# Patient Record
Sex: MALE | Race: WHITE | NOT HISPANIC OR LATINO | Employment: STUDENT | URBAN - METROPOLITAN AREA
[De-identification: names, ages, dates, MRNs, and addresses within clinical notes are randomized per-mention and may not be internally consistent; named-entity substitution may affect disease eponyms.]

---

## 2019-06-01 ENCOUNTER — OFFICE VISIT (OUTPATIENT)
Dept: FAMILY MEDICINE | Facility: CLINIC | Age: 24
End: 2019-06-01
Payer: COMMERCIAL

## 2019-06-01 VITALS
SYSTOLIC BLOOD PRESSURE: 100 MMHG | WEIGHT: 163.38 LBS | DIASTOLIC BLOOD PRESSURE: 60 MMHG | TEMPERATURE: 99 F | OXYGEN SATURATION: 99 % | HEART RATE: 64 BPM | HEIGHT: 72 IN | BODY MASS INDEX: 22.13 KG/M2

## 2019-06-01 DIAGNOSIS — Z23 NEED FOR HPV VACCINATION: ICD-10-CM

## 2019-06-01 DIAGNOSIS — J02.9 SORE THROAT: Primary | ICD-10-CM

## 2019-06-01 PROCEDURE — 3008F BODY MASS INDEX DOCD: CPT | Mod: CPTII,S$GLB,, | Performed by: INTERNAL MEDICINE

## 2019-06-01 PROCEDURE — 90651 HPV VACCINE 9-VALENT 3 DOSE IM: ICD-10-PCS | Mod: S$GLB,,, | Performed by: INTERNAL MEDICINE

## 2019-06-01 PROCEDURE — 3008F PR BODY MASS INDEX (BMI) DOCUMENTED: ICD-10-PCS | Mod: CPTII,S$GLB,, | Performed by: INTERNAL MEDICINE

## 2019-06-01 PROCEDURE — 99203 PR OFFICE/OUTPT VISIT, NEW, LEVL III, 30-44 MIN: ICD-10-PCS | Mod: 25,S$GLB,, | Performed by: INTERNAL MEDICINE

## 2019-06-01 PROCEDURE — 99203 OFFICE O/P NEW LOW 30 MIN: CPT | Mod: 25,S$GLB,, | Performed by: INTERNAL MEDICINE

## 2019-06-01 PROCEDURE — 90651 9VHPV VACCINE 2/3 DOSE IM: CPT | Mod: S$GLB,,, | Performed by: INTERNAL MEDICINE

## 2019-06-01 PROCEDURE — 90471 IMMUNIZATION ADMIN: CPT | Mod: S$GLB,,, | Performed by: INTERNAL MEDICINE

## 2019-06-01 PROCEDURE — 90471 HPV VACCINE 9-VALENT 3 DOSE IM: ICD-10-PCS | Mod: S$GLB,,, | Performed by: INTERNAL MEDICINE

## 2019-06-01 NOTE — PROGRESS NOTES
"Subjective:       Patient ID: Mega Hastings is a 23 y.o. male.        Chief Complaint: Sore Throat  He presents with 2 days of sore throat and fatigue. No cold symptoms. No fevers or chills. No sinus congestion. No coughing or rashes.  His brother had mono 2 months ago and he is concerned.  He has been very tired lately.      Review of Systems   Constitutional: Positive for fatigue. Negative for activity change, appetite change, chills and fever.   HENT: Positive for sore throat. Negative for congestion, ear discharge, ear pain, mouth sores, postnasal drip, rhinorrhea and sinus pressure.    Eyes: Negative for pain, discharge and redness.   Respiratory: Negative for cough, chest tightness, shortness of breath and wheezing.    Gastrointestinal: Negative for abdominal pain, constipation, diarrhea, nausea and vomiting.   Genitourinary: Negative for dysuria.   Musculoskeletal: Negative for arthralgias and neck stiffness.   Skin: Negative for rash.   Neurological: Negative for headaches.   Hematological: Negative for adenopathy.       Objective:      Vitals:    06/01/19 0927   BP: 100/60   Pulse: 64   Temp: 98.5 °F (36.9 °C)   TempSrc: Oral   SpO2: 99%   Weight: 74.1 kg (163 lb 6.4 oz)   Height: 5' 11.5" (1.816 m)     Body mass index is 22.47 kg/m².  Physical Exam    General appearance: alert, no acute distress  Head: atraumatic  Eyes: PERRL, EMOI, normal conjunctiva, no drainage  Ears: tm normal with good visualization of landmarks, no erythema or pus, canals normal, external ear normal  Nose: normal mucosa, no polyps or sores, no rhinorrhea  Throat: mild erythema, no exudates, tonsils appear normal  Mouth: no sores or lesion, moist mucous membranes  Neck: supple, FROM, no masses, no tenderness  Lymph: no posterior or cervical adenopathy  Lungs: no distress, no retractions, clear to ascultation bilaterally, no wheezing, no rales, no rhonchi  Heart:: Regular rate and rhythm, no murmur  Abdomen: soft, non-tender, no " guarding, no rebound, no peritoneal signs, bowel sounds normal, no hepatosplenomegaly, no masses  Skin: no rashes or lesion  Perfusion: good capillary refill, normal pulses      Assessment:       1. Sore throat    2. Need for HPV vaccination        Plan:       Sore throat  Reassurance and supportive care. No need for abx at this time. Advised of symptoms of worsening to return to clinic.   -     POCT Infectious mononucleosis antibody---neg  -     Strep A culture, throat---neg    Need for HPV vaccination  -     HPV Vaccine (9-Valent) (3 Dose) (IM)    Follow up in about 3 months (around 9/1/2019) for nurse visit for HPV #2.

## 2019-08-23 ENCOUNTER — OCCUPATIONAL HEALTH (OUTPATIENT)
Dept: URGENT CARE | Facility: CLINIC | Age: 24
End: 2019-08-23

## 2019-08-23 DIAGNOSIS — Z02.1 PRE-EMPLOYMENT EXAMINATION: Primary | ICD-10-CM

## 2019-08-23 LAB
BILIRUB UR QL STRIP: NEGATIVE
GLUCOSE UR QL STRIP: NEGATIVE
KETONES UR QL STRIP: NEGATIVE
LEUKOCYTE ESTERASE UR QL STRIP: NEGATIVE
PH, POC UA: 6.5 (ref 5–8)
POC BLOOD, URINE: NEGATIVE
POC NITRATES, URINE: NEGATIVE
PROT UR QL STRIP: NEGATIVE
SP GR UR STRIP: 1.02 (ref 1–1.03)
UROBILINOGEN UR STRIP-ACNC: NORMAL (ref 0.3–2.2)

## 2019-08-23 PROCEDURE — 99499 PHYSICAL, BASIC COMPLEXITY: ICD-10-PCS | Mod: S$GLB,,, | Performed by: PHYSICIAN ASSISTANT

## 2019-08-23 PROCEDURE — 99499 UNLISTED E&M SERVICE: CPT | Mod: S$GLB,,, | Performed by: PHYSICIAN ASSISTANT

## 2019-08-23 PROCEDURE — 80305 OOH NON-DOT DRUG SCREEN: ICD-10-PCS | Mod: S$GLB,,, | Performed by: PHYSICIAN ASSISTANT

## 2019-08-23 PROCEDURE — 80305 DRUG TEST PRSMV DIR OPT OBS: CPT | Mod: S$GLB,,, | Performed by: PHYSICIAN ASSISTANT

## 2019-09-04 ENCOUNTER — TELEPHONE (OUTPATIENT)
Dept: FAMILY MEDICINE | Facility: CLINIC | Age: 24
End: 2019-09-04

## 2019-09-04 NOTE — TELEPHONE ENCOUNTER
Left message on recorder that Mr. Ayoub missed his nurse appt for 2 hep A.  Please call clinic to schedule.

## 2019-12-17 ENCOUNTER — TELEPHONE (OUTPATIENT)
Dept: PEDIATRICS | Facility: CLINIC | Age: 24
End: 2019-12-17

## 2019-12-17 NOTE — TELEPHONE ENCOUNTER
----- Message from Lori Franks sent at 12/17/2019 11:36 AM CST -----  Contact: mom  Type: Needs Medical Advice    Who Called:  mom  Best Call Back Number: [phone]  Additional Information: mom would like to know if patient still take tamiflu or not.Thanks!

## 2020-03-10 ENCOUNTER — NURSE TRIAGE (OUTPATIENT)
Dept: ADMINISTRATIVE | Facility: CLINIC | Age: 25
End: 2020-03-10

## 2020-03-10 NOTE — TELEPHONE ENCOUNTER
Mother calling with pt at side, reports mild productive cough and temp of 102.5 fever, though pt overall sounds well and unconcerned with symptoms. Home care advice and flu prevention education given per nursing knowledge and triage protocol. Mother and pt verbalized understanding.    Reason for Disposition   Cough    Additional Information   Negative: Severe difficulty breathing (e.g., struggling for each breath, speaks in single words)   Negative: [1] Difficulty breathing AND [2] exposure to flames, smoke, or fumes   Negative: Bluish (or gray) lips or face now   Negative: [1] Stridor AND [2] difficulty breathing   Negative: Difficulty breathing   Negative: Chest pain  (Exception: MILD central chest pain, present only when coughing)   Negative: Patient sounds very sick or weak to the triager   Negative: [1] Coughed up blood AND [2] > 1 tablespoon (15 ml) (Exception: blood-tinged sputum)   Negative: Fever > 103 F (39.4 C)   Negative: [1] Fever > 101 F (38.3 C) AND [2] age > 60   Negative: [1] Fever > 100.0 F (37.8 C) AND [2] bedridden (e.g., nursing home patient, CVA, chronic illness, recovering from surgery)   Negative: [1] Fever > 100.0 F (37.8 C) AND [2] diabetes mellitus or weak immune system (e.g., HIV positive, cancer chemo, splenectomy, chronic steroids)   Negative: Wheezing is present   Negative: SEVERE coughing spells (e.g., whooping sound after coughing, vomiting after coughing)   Negative: [1] Continuous (nonstop) coughing interferes with work or school AND [2] no improvement using cough treatment per Care Advice   Negative: Coughing up romelia-colored (reddish-brown) sputum   Negative: Fever present > 3 days (72 hours)   Negative: [1] Fever returns after gone for over 24 hours AND [2] symptoms worse or not improved   Negative: [1] Using nasal washes and pain medicine > 24 hours AND [2] sinus pain (around cheekbone or eye) persists   Negative: Earache   Negative: [1] Known COPD or  other severe lung disease (i.e., bronchiectasis, cystic fibrosis, lung surgery) AND [2] worsening symptoms (i.e., increased sputum purulence or amount, increased breathing difficulty   Negative: Cough has been present for > 3 weeks   Negative: [1] Nasal discharge AND [2] present > 10 days   Negative: [1] Coughed up blood-tinged sputum AND [2] more than once   Negative: Exposure to TB (Tuberculosis)    Protocols used: COUGH - ACUTE [id number]

## 2020-03-11 ENCOUNTER — NURSE TRIAGE (OUTPATIENT)
Dept: ADMINISTRATIVE | Facility: CLINIC | Age: 25
End: 2020-03-11

## 2020-03-11 NOTE — TELEPHONE ENCOUNTER
Patient's mother reports she called last night and states fever has gone down and then up . Patients mother reports patient had diarrhea, lethargic, fever at 101.5, coughing . Tamiflu was called in per protocol. The patient's mother was advised and verbalized understanding.     Reason for Disposition   [1] Patient is NOT HIGH RISK AND [2] strongly requests antiviral medicine AND [3] flu symptoms present < 48 hours    Additional Information   Negative: Severe difficulty breathing (e.g., struggling for each breath, speaks in single words)   Negative: Bluish (or gray) lips or face now   Negative: Shock suspected (e.g., cold/pale/clammy skin, too weak to stand, low BP, rapid pulse)   Negative: Sounds like a life-threatening emergency to the triager   Negative: [1] Sounds like a cold AND [2] no fever   Negative: [1] Cough with sputum AND [2] no fever   Negative: [1] Dry cough (no sputum) sputum AND [2] no fever   Negative: [1] Throat pain AND [2] no fever   Negative: Influenza vaccine reaction is suspected   Negative: Chest pain  (Exception: MILD central chest pain, present only when coughing)   Negative: [1] Headache AND [2] stiff neck (can't touch chin to chest)   Negative: Fever > 104 F (40 C)   Negative: [1] Difficulty breathing AND [2] not severe AND [3] not from stuffy nose (e.g., not relieved by cleaning out the nose)   Negative: Patient sounds very sick or weak to the triager   Negative: [1] Fever > 101 F (38.3 C) AND [2] age > 60   Negative: [1] Fever > 100.0 F (37.8 C) AND [2] bedridden (e.g., nursing home patient, CVA, chronic illness, recovering from surgery)   Negative: [1] Fever > 100.0 F (37.8 C) AND [2] diabetes mellitus or weak immune system (e.g., HIV positive, cancer chemo, splenectomy, chronic steroids)   Negative: Patient is HIGH RISK (e.g., age > 64 years, pregnant, HIV+, or chronic medical condition)   Negative: Fever present > 3 days (72 hours)   Negative: [1] Fever returns  after gone for over 24 hours AND [2] symptoms worse or not improved   Negative: [1] Using nasal washes and pain medicine > 24 hours AND [2] sinus pain (around cheekbone or eye) persists   Negative: Earache    Protocols used: INFLUENZA - SEASONAL-A-AH

## 2020-03-12 ENCOUNTER — OFFICE VISIT (OUTPATIENT)
Dept: FAMILY MEDICINE | Facility: CLINIC | Age: 25
End: 2020-03-12
Payer: COMMERCIAL

## 2020-03-12 VITALS
BODY MASS INDEX: 20.77 KG/M2 | HEART RATE: 84 BPM | HEIGHT: 72 IN | DIASTOLIC BLOOD PRESSURE: 60 MMHG | WEIGHT: 153.31 LBS | TEMPERATURE: 99 F | SYSTOLIC BLOOD PRESSURE: 118 MMHG | RESPIRATION RATE: 20 BRPM | OXYGEN SATURATION: 95 %

## 2020-03-12 DIAGNOSIS — R50.9 FEVER, UNSPECIFIED FEVER CAUSE: Primary | ICD-10-CM

## 2020-03-12 DIAGNOSIS — J10.1 INFLUENZA A: ICD-10-CM

## 2020-03-12 LAB
CTP QC/QA: YES
FLUAV AG NPH QL: POSITIVE
FLUBV AG NPH QL: NEGATIVE

## 2020-03-12 PROCEDURE — 99214 OFFICE O/P EST MOD 30 MIN: CPT | Mod: 25,S$GLB,, | Performed by: NURSE PRACTITIONER

## 2020-03-12 PROCEDURE — 3008F PR BODY MASS INDEX (BMI) DOCUMENTED: ICD-10-PCS | Mod: CPTII,S$GLB,, | Performed by: NURSE PRACTITIONER

## 2020-03-12 PROCEDURE — 87804 POCT INFLUENZA A/B: ICD-10-PCS | Mod: 59,QW,, | Performed by: NURSE PRACTITIONER

## 2020-03-12 PROCEDURE — 87804 INFLUENZA ASSAY W/OPTIC: CPT | Mod: QW,,, | Performed by: NURSE PRACTITIONER

## 2020-03-12 PROCEDURE — 3008F BODY MASS INDEX DOCD: CPT | Mod: CPTII,S$GLB,, | Performed by: NURSE PRACTITIONER

## 2020-03-12 PROCEDURE — 99214 PR OFFICE/OUTPT VISIT, EST, LEVL IV, 30-39 MIN: ICD-10-PCS | Mod: 25,S$GLB,, | Performed by: NURSE PRACTITIONER

## 2020-03-12 RX ORDER — OSELTAMIVIR PHOSPHATE 75 MG/1
CAPSULE ORAL
COMMUNITY
Start: 2020-03-11 | End: 2021-05-26

## 2020-03-12 NOTE — PROGRESS NOTES
Subjective:       Patient ID: Mega Hastings is a 24 y.o. male.    Chief Complaint: Sore Throat; Fever; Nasal Congestion; and Emesis    HPI has been having fever, sore throat, congestion for the past 2 days. Vomited this morning. His mother called the Coronavirus hotline and they sent in tamiflu for him. He is here today to be tested for the flu.  See ROS.    The following portion of the patients history was reviewed and updated as appropriate: allergies, current medications, past medical and surgical history. Past social history and problem list reviewed. Family PMH and Past social history reviewed. Tobacco, Illicit drug use reviewed.      Review of patient's allergies indicates:   Allergen Reactions    No known drug allergies          Current Outpatient Medications:     oseltamivir (TAMIFLU) 75 MG capsule, , Disp: , Rfl:     History reviewed. No pertinent past medical history.    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[employer], plays trumpet   Social Needs    Financial resource strain: Not on file    Food insecurity:     Worry: Not on file     Inability: Not on file    Transportation needs:     Medical: Not on file     Non-medical: Not on file   Tobacco Use    Smoking status: Never Smoker    Smokeless tobacco: Never Used   Substance and Sexual Activity    Alcohol use: Yes     Alcohol/week: 5.0 standard drinks     Types: 5 Cans of beer per week     Frequency: 2-3 times a week    Drug use: No    Sexual activity: Never   Lifestyle    Physical activity:     Days per week: Not on file     Minutes per session: Not on file    Stress: Not on file   Relationships    Social  "connections:     Talks on phone: Not on file     Gets together: Not on file     Attends Voodoo service: Not on file     Active member of club or organization: Not on file     Attends meetings of clubs or organizations: Not on file     Relationship status: Not on file   Other Topics Concern    Not on file   Social History Narrative    Not on file     Review of Systems   Constitutional: Positive for fatigue and fever.   HENT: Positive for congestion, postnasal drip, rhinorrhea and sore throat.    Respiratory: Positive for cough. Negative for chest tightness, shortness of breath and wheezing.    Cardiovascular: Negative for chest pain and palpitations.   Gastrointestinal: Positive for nausea and vomiting (after taking the tamiflu). Negative for abdominal pain and diarrhea.   Musculoskeletal: Positive for myalgias. Negative for arthralgias, back pain and gait problem.   Neurological: Positive for headaches.       Objective:      /60   Pulse 84   Temp 98.7 °F (37.1 °C) (Oral)   Resp 20   Ht 5' 11.5" (1.816 m)   Wt 69.6 kg (153 lb 5.3 oz)   SpO2 95%   BMI 21.09 kg/m²      Physical Exam   Constitutional: He is oriented to person, place, and time. He appears well-developed and well-nourished. No distress.   HENT:   Head: Normocephalic and atraumatic.   Right Ear: Tympanic membrane, external ear and ear canal normal.   Left Ear: Tympanic membrane, external ear and ear canal normal.   Nose: Rhinorrhea present. Right sinus exhibits no maxillary sinus tenderness and no frontal sinus tenderness. Left sinus exhibits no maxillary sinus tenderness and no frontal sinus tenderness.   Mouth/Throat: Uvula is midline and mucous membranes are normal. Posterior oropharyngeal erythema present. No oropharyngeal exudate or posterior oropharyngeal edema.   Eyes: Pupils are equal, round, and reactive to light. Conjunctivae are normal. Right eye exhibits no discharge. Left eye exhibits no discharge.   Neck: Normal range of " motion. Neck supple. No JVD present. No neck rigidity. No thyromegaly present.   Cardiovascular: Normal rate, regular rhythm and normal heart sounds. Exam reveals no gallop.   No murmur heard.  Pulmonary/Chest: Effort normal and breath sounds normal. No respiratory distress. He has no wheezes. He has no rhonchi. He has no rales. He exhibits no tenderness.   Abdominal: Soft. Bowel sounds are normal. He exhibits no distension. There is no tenderness. There is no rebound and no guarding.   Musculoskeletal: Normal range of motion. He exhibits no edema.   Gait and coordination normal.  strong, equal. Upper and lower extremity strength normal.    Lymphadenopathy:     He has no cervical adenopathy.   Neurological: He is alert and oriented to person, place, and time. He has normal strength.   Skin: Skin is warm and dry. Capillary refill takes less than 2 seconds. No rash noted. He is not diaphoretic.   Psychiatric: He has a normal mood and affect. His speech is normal and behavior is normal.   Nursing note and vitals reviewed.      Assessment:       1. Fever, unspecified fever cause    2. Influenza A        Plan:       Fever, unspecified fever cause: no fever in 24 hours. Motrin as needed. Hydrate well.  -     POCT Influenza A/B:   Results for orders placed or performed in visit on 03/12/20   POCT Influenza A/B   Result Value Ref Range    Rapid Influenza A Ag Positive (A) Negative    Rapid Influenza B Ag Negative Negative     Acceptable Yes        Influenza A: stop the tamiflu due to nausea and vomiting. It has been over 48 hours since symptoms started. No fever in 24 hours now. Precautions discussed.       Continue current medication  Take medications only as prescribed  Healthy diet  Adequate rest  Adequate hydration  Avoid allergens  Avoid excessive caffeine     follow up prn

## 2020-05-11 ENCOUNTER — OFFICE VISIT (OUTPATIENT)
Dept: FAMILY MEDICINE | Facility: CLINIC | Age: 25
End: 2020-05-11
Payer: COMMERCIAL

## 2020-05-11 VITALS
TEMPERATURE: 98 F | SYSTOLIC BLOOD PRESSURE: 122 MMHG | HEIGHT: 71 IN | DIASTOLIC BLOOD PRESSURE: 74 MMHG | HEART RATE: 86 BPM | WEIGHT: 164.81 LBS | RESPIRATION RATE: 16 BRPM | BODY MASS INDEX: 23.07 KG/M2

## 2020-05-11 DIAGNOSIS — G47.9 SLEEP DISTURBANCE: ICD-10-CM

## 2020-05-11 DIAGNOSIS — S66.411A STRAIN OF INTRINSIC MUSCLE OF RIGHT THUMB: Primary | ICD-10-CM

## 2020-05-11 PROCEDURE — 3008F BODY MASS INDEX DOCD: CPT | Mod: CPTII,S$GLB,, | Performed by: NURSE PRACTITIONER

## 2020-05-11 PROCEDURE — 3008F PR BODY MASS INDEX (BMI) DOCUMENTED: ICD-10-PCS | Mod: CPTII,S$GLB,, | Performed by: NURSE PRACTITIONER

## 2020-05-11 PROCEDURE — 99213 OFFICE O/P EST LOW 20 MIN: CPT | Mod: S$GLB,,, | Performed by: NURSE PRACTITIONER

## 2020-05-11 PROCEDURE — 99213 PR OFFICE/OUTPT VISIT, EST, LEVL III, 20-29 MIN: ICD-10-PCS | Mod: S$GLB,,, | Performed by: NURSE PRACTITIONER

## 2020-05-11 NOTE — PROGRESS NOTES
Subjective:       Patient ID: Mega Hastings is a 24 y.o. male.    Chief Complaint: Wrist Pain    HPI jammed his thumb left hand twice over the past few months. Then hyperextended his wrist. Wore a splint on the finger. Got better.  Playing video games caused 3 days of thumb pain. Better now.  States his mom just wanted it looked at. He has a soft round lump to the left wrist area. Area is not tender. Does not know how long it has been there.     Trouble falling asleep. OTC medications tried. Melatonin. States he does not have a regular sleep schedule. We discussed sleep hygiene and getting on a regular schedule. He does not feel that he needs medication for sleep. Recommended taking zyrtec at bedtime if needed. No other concerns. See ROS.    The following portion of the patients history was reviewed and updated as appropriate: allergies, current medications, past medical and surgical history. Past social history and problem list reviewed. Family PMH and Past social history reviewed. Tobacco, Illicit drug use reviewed.      Review of patient's allergies indicates:  No Known Allergies    No current outpatient medications on file.    History reviewed. No pertinent past medical history.    Past Surgical History:   Procedure Laterality Date    KNEE ARTHROSCOPY Left 2008       Social History     Socioeconomic History    Marital status: Single     Spouse name: Not on file    Number of children: Not on file    Years of education: Not on file    Highest education level: Not on file   Occupational History    Not on file   Social Needs    Financial resource strain: Not on file    Food insecurity:     Worry: Not on file     Inability: Not on file    Transportation needs:     Medical: Not on file     Non-medical: Not on file   Tobacco Use    Smoking status: Never Smoker    Smokeless tobacco: Never Used   Substance and Sexual Activity    Alcohol use: Yes     Alcohol/week: 8.0 standard drinks     Types: 8 Cans of  "beer per week    Drug use: Yes     Types: Marijuana    Sexual activity: Not Currently   Lifestyle    Physical activity:     Days per week: Not on file     Minutes per session: Not on file    Stress: Not on file   Relationships    Social connections:     Talks on phone: Not on file     Gets together: Not on file     Attends [employer] service: Not on file     Active member of club or organization: Not on file     Attends meetings of clubs or organizations: Not on file     Relationship status: Not on file   Other Topics Concern    Not on file   Social History Narrative    Not on file     Review of Systems   Constitutional: Negative for fatigue and fever.   HENT: Negative for postnasal drip, rhinorrhea and trouble swallowing.    Eyes: Negative for visual disturbance.   Respiratory: Negative for cough, chest tightness, shortness of breath and wheezing.    Cardiovascular: Negative for chest pain and palpitations.   Gastrointestinal: Negative for abdominal pain, diarrhea, nausea and vomiting.   Musculoskeletal: Positive for arthralgias (right thumb see HPI). Negative for back pain and gait problem.   Skin: Negative for rash.   Neurological: Negative for weakness and headaches.   Psychiatric/Behavioral: Positive for sleep disturbance (has trouble falling asleep). Negative for dysphoric mood. The patient is not nervous/anxious.        Objective:      /74 (BP Location: Left arm, Patient Position: Sitting)   Pulse 86   Temp 97.7 °F (36.5 °C) (Oral)   Resp 16   Ht 5' 11" (1.803 m)   Wt 74.7 kg (164 lb 12.7 oz)   BMI 22.98 kg/m²      Physical Exam   Constitutional: He is oriented to person, place, and time. He appears well-developed and well-nourished. No distress.   HENT:   Head: Normocephalic and atraumatic.   Mouth/Throat: No oropharyngeal exudate.   Eyes: Pupils are equal, round, and reactive to light. Conjunctivae are normal. Right eye exhibits no discharge. Left eye exhibits no discharge.   Neck: Normal " range of motion. Neck supple. No thyromegaly present.   Cardiovascular: Normal rate, regular rhythm and normal heart sounds. Exam reveals no gallop.   No murmur heard.  Pulmonary/Chest: Effort normal and breath sounds normal. No respiratory distress. He has no wheezes. He has no rales. He exhibits no tenderness.   Abdominal: Soft. Bowel sounds are normal. He exhibits no distension. There is no tenderness. There is no rebound and no guarding.   Musculoskeletal: Normal range of motion. He exhibits no edema or tenderness.   Gait and coordination normal.  strong, equal. Upper and lower extremity strength normal. No tenderness with palpation to the thumb joint. Soft tissue area to wrist is just a vein, nothing cystic.    Neurological: He is alert and oriented to person, place, and time.   Skin: Skin is warm and dry. Capillary refill takes less than 2 seconds. No rash noted. He is not diaphoretic.   Psychiatric: He has a normal mood and affect. His behavior is normal. Thought content normal.   Nursing note and vitals reviewed.      Assessment:       1. Strain of intrinsic muscle of right thumb    2. Sleep disturbance        Plan:       Strain of intrinsic muscle of right thumb: no pain with the thumb at this time. Can wear a splint as needed. Avoid straining the thumb. If symptoms return then can refer to hand doctor.     Sleep disturbance: implore sleep hygiene. Get on regular schedule. No napping. Avoid caffeine. Can try zyrtec at bedtime. Follow up if not improving.     Continue current medication  Take medications only as prescribed  Healthy diet, exercise  Adequate rest  Adequate hydration  Avoid allergens  Avoid excessive caffeine     follow up as needed if not improving

## 2021-03-10 ENCOUNTER — IMMUNIZATION (OUTPATIENT)
Dept: PRIMARY CARE CLINIC | Facility: CLINIC | Age: 26
End: 2021-03-10

## 2021-03-10 DIAGNOSIS — Z23 NEED FOR VACCINATION: Primary | ICD-10-CM

## 2021-03-10 PROCEDURE — 91303 PR SARSCOV2 VAC AD26 .5ML IM: CPT | Mod: S$GLB,,, | Performed by: INTERNAL MEDICINE

## 2021-03-10 PROCEDURE — 91303 PR SARSCOV2 VAC AD26 .5ML IM: ICD-10-PCS | Mod: S$GLB,,, | Performed by: INTERNAL MEDICINE

## 2021-03-10 PROCEDURE — 0031A PR IMMUNIZ ADMIN, SARS-COV-2 COVID-19 VACC, 5X10VP/0.5ML: ICD-10-PCS | Mod: CV19,S$GLB,, | Performed by: INTERNAL MEDICINE

## 2021-03-10 PROCEDURE — 0031A PR IMMUNIZ ADMIN, SARS-COV-2 COVID-19 VACC, 5X10VP/0.5ML: CPT | Mod: CV19,S$GLB,, | Performed by: INTERNAL MEDICINE

## 2021-05-26 ENCOUNTER — OFFICE VISIT (OUTPATIENT)
Dept: FAMILY MEDICINE | Facility: CLINIC | Age: 26
End: 2021-05-26
Payer: COMMERCIAL

## 2021-05-26 VITALS
HEIGHT: 71 IN | SYSTOLIC BLOOD PRESSURE: 128 MMHG | DIASTOLIC BLOOD PRESSURE: 82 MMHG | HEART RATE: 67 BPM | RESPIRATION RATE: 16 BRPM | TEMPERATURE: 99 F | BODY MASS INDEX: 23.87 KG/M2 | WEIGHT: 170.5 LBS

## 2021-05-26 DIAGNOSIS — Z00.00 ROUTINE PHYSICAL EXAMINATION: Primary | ICD-10-CM

## 2021-05-26 DIAGNOSIS — R40.0 HAS DAYTIME DROWSINESS: ICD-10-CM

## 2021-05-26 DIAGNOSIS — Z11.59 NEED FOR HEPATITIS C SCREENING TEST: ICD-10-CM

## 2021-05-26 DIAGNOSIS — Z11.4 ENCOUNTER FOR SCREENING FOR HIV: ICD-10-CM

## 2021-05-26 DIAGNOSIS — Z00.00 LABORATORY EXAM ORDERED AS PART OF ROUTINE GENERAL MEDICAL EXAMINATION: ICD-10-CM

## 2021-05-26 PROCEDURE — 86803 HEPATITIS C AB TEST: CPT | Performed by: NURSE PRACTITIONER

## 2021-05-26 PROCEDURE — 36415 COLL VENOUS BLD VENIPUNCTURE: CPT | Mod: S$GLB,,, | Performed by: NURSE PRACTITIONER

## 2021-05-26 PROCEDURE — 83036 HEMOGLOBIN GLYCOSYLATED A1C: CPT | Performed by: NURSE PRACTITIONER

## 2021-05-26 PROCEDURE — 36415 PR COLLECTION VENOUS BLOOD,VENIPUNCTURE: ICD-10-PCS | Mod: S$GLB,,, | Performed by: NURSE PRACTITIONER

## 2021-05-26 PROCEDURE — 80061 LIPID PANEL: CPT | Performed by: NURSE PRACTITIONER

## 2021-05-26 PROCEDURE — 1126F AMNT PAIN NOTED NONE PRSNT: CPT | Mod: S$GLB,,, | Performed by: NURSE PRACTITIONER

## 2021-05-26 PROCEDURE — 80053 COMPREHEN METABOLIC PANEL: CPT | Performed by: NURSE PRACTITIONER

## 2021-05-26 PROCEDURE — 86703 HIV-1/HIV-2 1 RESULT ANTBDY: CPT | Performed by: NURSE PRACTITIONER

## 2021-05-26 PROCEDURE — 3008F BODY MASS INDEX DOCD: CPT | Mod: CPTII,S$GLB,, | Performed by: NURSE PRACTITIONER

## 2021-05-26 PROCEDURE — 3008F PR BODY MASS INDEX (BMI) DOCUMENTED: ICD-10-PCS | Mod: CPTII,S$GLB,, | Performed by: NURSE PRACTITIONER

## 2021-05-26 PROCEDURE — 85025 COMPLETE CBC W/AUTO DIFF WBC: CPT | Performed by: NURSE PRACTITIONER

## 2021-05-26 PROCEDURE — 1126F PR PAIN SEVERITY QUANTIFIED, NO PAIN PRESENT: ICD-10-PCS | Mod: S$GLB,,, | Performed by: NURSE PRACTITIONER

## 2021-05-26 PROCEDURE — 99395 PR PREVENTIVE VISIT,EST,18-39: ICD-10-PCS | Mod: S$GLB,,, | Performed by: NURSE PRACTITIONER

## 2021-05-26 PROCEDURE — 99395 PREV VISIT EST AGE 18-39: CPT | Mod: S$GLB,,, | Performed by: NURSE PRACTITIONER

## 2021-05-26 PROCEDURE — 84443 ASSAY THYROID STIM HORMONE: CPT | Performed by: NURSE PRACTITIONER

## 2021-05-27 LAB
ALBUMIN SERPL BCP-MCNC: 4.5 G/DL (ref 3.5–5.2)
ALP SERPL-CCNC: 68 U/L (ref 55–135)
ALT SERPL W/O P-5'-P-CCNC: 16 U/L (ref 10–44)
ANION GAP SERPL CALC-SCNC: 11 MMOL/L (ref 8–16)
AST SERPL-CCNC: 18 U/L (ref 10–40)
BASOPHILS # BLD AUTO: 0.04 K/UL (ref 0–0.2)
BASOPHILS NFR BLD: 0.7 % (ref 0–1.9)
BILIRUB SERPL-MCNC: 0.8 MG/DL (ref 0.1–1)
BUN SERPL-MCNC: 13 MG/DL (ref 6–20)
CALCIUM SERPL-MCNC: 9.8 MG/DL (ref 8.7–10.5)
CHLORIDE SERPL-SCNC: 109 MMOL/L (ref 95–110)
CHOLEST SERPL-MCNC: 172 MG/DL (ref 120–199)
CHOLEST/HDLC SERPL: 3.7 {RATIO} (ref 2–5)
CO2 SERPL-SCNC: 21 MMOL/L (ref 23–29)
CREAT SERPL-MCNC: 1 MG/DL (ref 0.5–1.4)
DIFFERENTIAL METHOD: NORMAL
EOSINOPHIL # BLD AUTO: 0.1 K/UL (ref 0–0.5)
EOSINOPHIL NFR BLD: 1.3 % (ref 0–8)
ERYTHROCYTE [DISTWIDTH] IN BLOOD BY AUTOMATED COUNT: 11.9 % (ref 11.5–14.5)
EST. GFR  (AFRICAN AMERICAN): >60 ML/MIN/1.73 M^2
EST. GFR  (NON AFRICAN AMERICAN): >60 ML/MIN/1.73 M^2
ESTIMATED AVG GLUCOSE: 97 MG/DL (ref 68–131)
GLUCOSE SERPL-MCNC: 94 MG/DL (ref 70–110)
HBA1C MFR BLD: 5 % (ref 4–5.6)
HCT VFR BLD AUTO: 44 % (ref 40–54)
HDLC SERPL-MCNC: 47 MG/DL (ref 40–75)
HDLC SERPL: 27.3 % (ref 20–50)
HGB BLD-MCNC: 14.9 G/DL (ref 14–18)
IMM GRANULOCYTES # BLD AUTO: 0.01 K/UL (ref 0–0.04)
IMM GRANULOCYTES NFR BLD AUTO: 0.2 % (ref 0–0.5)
LDLC SERPL CALC-MCNC: 112 MG/DL (ref 63–159)
LYMPHOCYTES # BLD AUTO: 2.3 K/UL (ref 1–4.8)
LYMPHOCYTES NFR BLD: 41.6 % (ref 18–48)
MCH RBC QN AUTO: 28.8 PG (ref 27–31)
MCHC RBC AUTO-ENTMCNC: 33.9 G/DL (ref 32–36)
MCV RBC AUTO: 85 FL (ref 82–98)
MONOCYTES # BLD AUTO: 0.3 K/UL (ref 0.3–1)
MONOCYTES NFR BLD: 6.1 % (ref 4–15)
NEUTROPHILS # BLD AUTO: 2.7 K/UL (ref 1.8–7.7)
NEUTROPHILS NFR BLD: 50.1 % (ref 38–73)
NONHDLC SERPL-MCNC: 125 MG/DL
NRBC BLD-RTO: 0 /100 WBC
PLATELET # BLD AUTO: 245 K/UL (ref 150–450)
PMV BLD AUTO: 11.5 FL (ref 9.2–12.9)
POTASSIUM SERPL-SCNC: 4 MMOL/L (ref 3.5–5.1)
PROT SERPL-MCNC: 7.5 G/DL (ref 6–8.4)
RBC # BLD AUTO: 5.18 M/UL (ref 4.6–6.2)
SODIUM SERPL-SCNC: 141 MMOL/L (ref 136–145)
TRIGL SERPL-MCNC: 65 MG/DL (ref 30–150)
TSH SERPL DL<=0.005 MIU/L-ACNC: 1.61 UIU/ML (ref 0.4–4)
WBC # BLD AUTO: 5.41 K/UL (ref 3.9–12.7)

## 2021-05-28 LAB
HCV AB SERPL QL IA: NEGATIVE
HIV 1+2 AB+HIV1 P24 AG SERPL QL IA: NEGATIVE

## 2021-07-20 ENCOUNTER — TELEPHONE (OUTPATIENT)
Dept: FAMILY MEDICINE | Facility: CLINIC | Age: 26
End: 2021-07-20

## 2021-07-28 ENCOUNTER — OFFICE VISIT (OUTPATIENT)
Dept: FAMILY MEDICINE | Facility: CLINIC | Age: 26
End: 2021-07-28
Payer: COMMERCIAL

## 2021-07-28 ENCOUNTER — TELEPHONE (OUTPATIENT)
Dept: FAMILY MEDICINE | Facility: CLINIC | Age: 26
End: 2021-07-28

## 2021-07-28 VITALS
OXYGEN SATURATION: 96 % | RESPIRATION RATE: 18 BRPM | BODY MASS INDEX: 24.26 KG/M2 | WEIGHT: 173.94 LBS | SYSTOLIC BLOOD PRESSURE: 102 MMHG | TEMPERATURE: 98 F | HEART RATE: 78 BPM | DIASTOLIC BLOOD PRESSURE: 78 MMHG

## 2021-07-28 DIAGNOSIS — J06.9 URI, ACUTE: Primary | ICD-10-CM

## 2021-07-28 PROCEDURE — 1159F PR MEDICATION LIST DOCUMENTED IN MEDICAL RECORD: ICD-10-PCS | Mod: CPTII,S$GLB,, | Performed by: FAMILY MEDICINE

## 2021-07-28 PROCEDURE — 99213 PR OFFICE/OUTPT VISIT, EST, LEVL III, 20-29 MIN: ICD-10-PCS | Mod: S$GLB,,, | Performed by: FAMILY MEDICINE

## 2021-07-28 PROCEDURE — 3008F BODY MASS INDEX DOCD: CPT | Mod: CPTII,S$GLB,, | Performed by: FAMILY MEDICINE

## 2021-07-28 PROCEDURE — U0005 INFEC AGEN DETEC AMPLI PROBE: HCPCS | Performed by: FAMILY MEDICINE

## 2021-07-28 PROCEDURE — 1160F PR REVIEW ALL MEDS BY PRESCRIBER/CLIN PHARMACIST DOCUMENTED: ICD-10-PCS | Mod: CPTII,S$GLB,, | Performed by: FAMILY MEDICINE

## 2021-07-28 PROCEDURE — 99213 OFFICE O/P EST LOW 20 MIN: CPT | Mod: S$GLB,,, | Performed by: FAMILY MEDICINE

## 2021-07-28 PROCEDURE — 99999 PR PBB SHADOW E&M-EST. PATIENT-LVL III: ICD-10-PCS | Mod: PBBFAC,,, | Performed by: FAMILY MEDICINE

## 2021-07-28 PROCEDURE — 1159F MED LIST DOCD IN RCRD: CPT | Mod: CPTII,S$GLB,, | Performed by: FAMILY MEDICINE

## 2021-07-28 PROCEDURE — 99999 PR PBB SHADOW E&M-EST. PATIENT-LVL III: CPT | Mod: PBBFAC,,, | Performed by: FAMILY MEDICINE

## 2021-07-28 PROCEDURE — 1160F RVW MEDS BY RX/DR IN RCRD: CPT | Mod: CPTII,S$GLB,, | Performed by: FAMILY MEDICINE

## 2021-07-28 PROCEDURE — 1126F PR PAIN SEVERITY QUANTIFIED, NO PAIN PRESENT: ICD-10-PCS | Mod: CPTII,S$GLB,, | Performed by: FAMILY MEDICINE

## 2021-07-28 PROCEDURE — U0003 INFECTIOUS AGENT DETECTION BY NUCLEIC ACID (DNA OR RNA); SEVERE ACUTE RESPIRATORY SYNDROME CORONAVIRUS 2 (SARS-COV-2) (CORONAVIRUS DISEASE [COVID-19]), AMPLIFIED PROBE TECHNIQUE, MAKING USE OF HIGH THROUGHPUT TECHNOLOGIES AS DESCRIBED BY CMS-2020-01-R: HCPCS | Performed by: FAMILY MEDICINE

## 2021-07-28 PROCEDURE — 1126F AMNT PAIN NOTED NONE PRSNT: CPT | Mod: CPTII,S$GLB,, | Performed by: FAMILY MEDICINE

## 2021-07-28 PROCEDURE — 3008F PR BODY MASS INDEX (BMI) DOCUMENTED: ICD-10-PCS | Mod: CPTII,S$GLB,, | Performed by: FAMILY MEDICINE

## 2021-07-29 LAB
SARS-COV-2 RNA RESP QL NAA+PROBE: NOT DETECTED
SARS-COV-2- CYCLE NUMBER: -1

## 2021-07-30 ENCOUNTER — OFFICE VISIT (OUTPATIENT)
Dept: FAMILY MEDICINE | Facility: CLINIC | Age: 26
End: 2021-07-30
Payer: COMMERCIAL

## 2021-07-30 DIAGNOSIS — F51.04 CHRONIC INSOMNIA: Primary | ICD-10-CM

## 2021-07-30 PROCEDURE — 99213 PR OFFICE/OUTPT VISIT, EST, LEVL III, 20-29 MIN: ICD-10-PCS | Mod: 95,,, | Performed by: FAMILY MEDICINE

## 2021-07-30 PROCEDURE — 3044F HG A1C LEVEL LT 7.0%: CPT | Mod: CPTII,,, | Performed by: FAMILY MEDICINE

## 2021-07-30 PROCEDURE — 1160F RVW MEDS BY RX/DR IN RCRD: CPT | Mod: CPTII,,, | Performed by: FAMILY MEDICINE

## 2021-07-30 PROCEDURE — 1159F PR MEDICATION LIST DOCUMENTED IN MEDICAL RECORD: ICD-10-PCS | Mod: CPTII,,, | Performed by: FAMILY MEDICINE

## 2021-07-30 PROCEDURE — 3044F PR MOST RECENT HEMOGLOBIN A1C LEVEL <7.0%: ICD-10-PCS | Mod: CPTII,,, | Performed by: FAMILY MEDICINE

## 2021-07-30 PROCEDURE — 1160F PR REVIEW ALL MEDS BY PRESCRIBER/CLIN PHARMACIST DOCUMENTED: ICD-10-PCS | Mod: CPTII,,, | Performed by: FAMILY MEDICINE

## 2021-07-30 PROCEDURE — 99213 OFFICE O/P EST LOW 20 MIN: CPT | Mod: 95,,, | Performed by: FAMILY MEDICINE

## 2021-07-30 PROCEDURE — 1159F MED LIST DOCD IN RCRD: CPT | Mod: CPTII,,, | Performed by: FAMILY MEDICINE

## 2021-07-30 RX ORDER — TRAZODONE HYDROCHLORIDE 150 MG/1
TABLET ORAL
Qty: 30 TABLET | Refills: 11 | Status: SHIPPED | OUTPATIENT
Start: 2021-07-30

## 2022-08-01 ENCOUNTER — TELEPHONE (OUTPATIENT)
Dept: SLEEP MEDICINE | Facility: CLINIC | Age: 27
End: 2022-08-01
Payer: COMMERCIAL

## 2022-08-02 ENCOUNTER — OFFICE VISIT (OUTPATIENT)
Dept: SLEEP MEDICINE | Facility: CLINIC | Age: 27
End: 2022-08-02
Payer: COMMERCIAL

## 2022-08-02 VITALS
BODY MASS INDEX: 24.07 KG/M2 | HEART RATE: 76 BPM | SYSTOLIC BLOOD PRESSURE: 135 MMHG | WEIGHT: 171.94 LBS | HEIGHT: 71 IN | DIASTOLIC BLOOD PRESSURE: 83 MMHG

## 2022-08-02 DIAGNOSIS — F51.09 OTHER INSOMNIA NOT DUE TO A SUBSTANCE OR KNOWN PHYSIOLOGICAL CONDITION: Primary | ICD-10-CM

## 2022-08-02 DIAGNOSIS — G47.21 DELAYED SLEEP PHASE SYNDROME: ICD-10-CM

## 2022-08-02 PROCEDURE — 3008F BODY MASS INDEX DOCD: CPT | Mod: CPTII,S$GLB,, | Performed by: INTERNAL MEDICINE

## 2022-08-02 PROCEDURE — 1159F MED LIST DOCD IN RCRD: CPT | Mod: CPTII,S$GLB,, | Performed by: INTERNAL MEDICINE

## 2022-08-02 PROCEDURE — 3008F PR BODY MASS INDEX (BMI) DOCUMENTED: ICD-10-PCS | Mod: CPTII,S$GLB,, | Performed by: INTERNAL MEDICINE

## 2022-08-02 PROCEDURE — 99999 PR PBB SHADOW E&M-EST. PATIENT-LVL III: CPT | Mod: PBBFAC,,, | Performed by: INTERNAL MEDICINE

## 2022-08-02 PROCEDURE — 99203 PR OFFICE/OUTPT VISIT, NEW, LEVL III, 30-44 MIN: ICD-10-PCS | Mod: S$GLB,,, | Performed by: INTERNAL MEDICINE

## 2022-08-02 PROCEDURE — 1159F PR MEDICATION LIST DOCUMENTED IN MEDICAL RECORD: ICD-10-PCS | Mod: CPTII,S$GLB,, | Performed by: INTERNAL MEDICINE

## 2022-08-02 PROCEDURE — 99999 PR PBB SHADOW E&M-EST. PATIENT-LVL III: ICD-10-PCS | Mod: PBBFAC,,, | Performed by: INTERNAL MEDICINE

## 2022-08-02 PROCEDURE — 3079F PR MOST RECENT DIASTOLIC BLOOD PRESSURE 80-89 MM HG: ICD-10-PCS | Mod: CPTII,S$GLB,, | Performed by: INTERNAL MEDICINE

## 2022-08-02 PROCEDURE — 3079F DIAST BP 80-89 MM HG: CPT | Mod: CPTII,S$GLB,, | Performed by: INTERNAL MEDICINE

## 2022-08-02 PROCEDURE — 99203 OFFICE O/P NEW LOW 30 MIN: CPT | Mod: S$GLB,,, | Performed by: INTERNAL MEDICINE

## 2022-08-02 PROCEDURE — 3075F SYST BP GE 130 - 139MM HG: CPT | Mod: CPTII,S$GLB,, | Performed by: INTERNAL MEDICINE

## 2022-08-02 PROCEDURE — 3075F PR MOST RECENT SYSTOLIC BLOOD PRESS GE 130-139MM HG: ICD-10-PCS | Mod: CPTII,S$GLB,, | Performed by: INTERNAL MEDICINE

## 2022-08-02 NOTE — PROGRESS NOTES
"  NEW PATIENT VISIT  Referred by Self, Aaareferrangelina COBOS Venkata  is a pleasant 27 y.o. male  with no significant PMH who presents for evaluation of insomnia. Reports that he has sleep onset insomnia with 2-3 hours latency that has been going on for about 10 years. Laying in bed playing music/podcast, occasionally watching TV, during latency. Hangs out in his room.    Tired for the first few hours upon waking up but then feels OK throughout the day. Denies naps.    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 Lives with two brothers, older heavier brother does snore.    Drinks socially 1-2 beers weekly, smokes weed every other day.    Prior sleep studies:  no    Insomnia?:  yes  Hypnotic use?: h/o trazodone (helped with latency down to 1 hour, a/e dry mouth, dry eyes), melatonin ("less than a full gummy" <1 year during college)    Bed partner:   denies  Witnessed snoring ?:  denies  Snoring arousals?: denies  Witnessed apneas? denies    H/H hallucinations?: denies  Sleep paralysis?: denies  Cataplexy?:      RLS?:   denies    Sleepy if inactive?: Exhausted but not sleepy  Sleepiness driving: denies  ESS:         SLEEP SCHEDULE   Bed Time 12 AM   Sleep Latency 2-3 hours   Arousals    Back to sleep    Wake time 9:30 - 11 AM   Naps    Nocturia n/a   Work Musician       Vitals:    08/02/22 1533   BP: 135/83   BP Location: Left arm   Patient Position: Sitting   BP Method: Medium (Automatic)   Pulse: 76   Weight: 78 kg (171 lb 15.3 oz)   Height: 5' 11" (1.803 m)       Physical Exam:    GEN:   Well-appearing  Psych:  Appropriate affect, demonstrates insight  SKIN:  No rash on the face or bridge of the nose    LABS:   No results found for: HGB, CO2    RECORDS REVIEWED PREVIOUSLY:    No prior sleep testing.    ASSESSMENT    PROBLEM DESCRIPTION/ Sx on " Presentation  STATUS   Screening for INOCENCIA   No report snoring or witnessed apnes    New   Daytime Sx   denies sleepiness when inactive though tired in the morning  denies sleepiness when driving   ESS 1/24 on intake  New   Insomnia   Onset:                    1-3 hours to fall asleep  Maintenance:         none  Stimulus control?:   Caffeine:    Alcohol:  occasional  Depression:    Anxiety:    Prior hypnotics:      THC  Current hypnotics: trazodone (hangover), MLT    New   Other issues:     PLAN     -current period 2AM to 10AM  -avoid light 2 hrs before bedtime  -maintain darkness until wake time (sleep mask)  -bright light x 30 minutes at wake time  -advance 1 hr every 3-4 days  -goal sleep period  to 10p-6AM  -sleep diary given    RTC          The patient was given open opportunity to ask questions and/or express concerns about treatment plan.   All questions/concerns were discussed.     Two patient identifiers used prior to evaluation.

## 2024-05-21 ENCOUNTER — OFFICE VISIT (OUTPATIENT)
Dept: URGENT CARE | Facility: CLINIC | Age: 29
End: 2024-05-21
Payer: COMMERCIAL

## 2024-05-21 VITALS
SYSTOLIC BLOOD PRESSURE: 141 MMHG | TEMPERATURE: 98 F | WEIGHT: 171 LBS | HEIGHT: 71 IN | BODY MASS INDEX: 23.94 KG/M2 | HEART RATE: 72 BPM | OXYGEN SATURATION: 97 % | DIASTOLIC BLOOD PRESSURE: 91 MMHG | RESPIRATION RATE: 18 BRPM

## 2024-05-21 DIAGNOSIS — J20.9 ACUTE BRONCHITIS, UNSPECIFIED ORGANISM: Primary | ICD-10-CM

## 2024-05-21 PROCEDURE — 71046 X-RAY EXAM CHEST 2 VIEWS: CPT | Mod: S$GLB,,, | Performed by: RADIOLOGY

## 2024-05-21 PROCEDURE — 99203 OFFICE O/P NEW LOW 30 MIN: CPT | Mod: S$GLB,,, | Performed by: PHYSICIAN ASSISTANT

## 2024-05-21 RX ORDER — PREDNISONE 10 MG/1
TABLET ORAL
Qty: 24 TABLET | Refills: 0 | Status: SHIPPED | OUTPATIENT
Start: 2024-05-21 | End: 2024-05-29

## 2024-05-21 RX ORDER — BENZONATATE 200 MG/1
200 CAPSULE ORAL 3 TIMES DAILY PRN
Qty: 30 CAPSULE | Refills: 0 | Status: SHIPPED | OUTPATIENT
Start: 2024-05-21 | End: 2024-05-31

## 2024-05-21 NOTE — PATIENT INSTRUCTIONS
We will follow up with a chest x-ray results once available.  We will make recommendations once results are available.  Use Tessalon as needed for cough. Tylenol with food as needed for pain relief.  [place] has been notified that you were en route.

## 2024-05-21 NOTE — PROGRESS NOTES
"Subjective:      Patient ID: Mega Hastings is a 28 y.o. male.    Vitals:  height is 5' 10.98" (1.803 m) and weight is 77.6 kg (171 lb). His oral temperature is 98.2 °F (36.8 °C). His blood pressure is 141/91 (abnormal) and his pulse is 72. His respiration is 18 and oxygen saturation is 97%.     Chief Complaint: Cough    This is a 28 y.o. male who presents today with a chief complaint of coughing up  some blood in the mornings. Patient states that last week he started coughing up some pink tinge color and today it was bright red. Patient states he did have a cough 10 days and he is still having some coughing at times. The cough seems to be worse at night when he is laying down. Patient states that he is wheezing at times.     Cough  This is a new problem. The current episode started 1 to 4 weeks ago. The problem has been unchanged. The problem occurs constantly. The cough is Productive of blood-tinged sputum. Associated symptoms include hemoptysis, a sore throat and wheezing. Pertinent negatives include no chest pain, chills, ear congestion, ear pain, fever, headaches, heartburn, myalgias, nasal congestion, postnasal drip, rash, rhinorrhea, shortness of breath, sweats or weight loss. Nothing aggravates the symptoms. Treatments tried: Mucinex. The treatment provided mild relief.       Constitution: Negative for chills, sweating, fatigue and fever.   HENT:  Positive for sore throat. Negative for ear pain, ear discharge, congestion, postnasal drip, sinus pain, sinus pressure, trouble swallowing and voice change.    Neck: Negative for neck pain and neck stiffness.   Cardiovascular:  Negative for chest pain.   Eyes:  Negative for eye discharge and eye itching.   Respiratory:  Positive for cough, sputum production, bloody sputum and wheezing. Negative for chest tightness and shortness of breath.    Gastrointestinal:  Negative for abdominal pain, nausea, vomiting, constipation, diarrhea and heartburn.   Musculoskeletal:  " Negative for muscle ache.   Skin:  Negative for rash.   Neurological:  Negative for dizziness and headaches.    History reviewed. No pertinent past medical history.    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 30 capsule 0     No current facility-administered medications for this visit.       Review of patient's allergies indicates:   Allergen Reactions    No known drug allergies        Objective:     Physical Exam   Constitutional: He is oriented to person, place, and time. He appears well-developed. He is cooperative.  Non-toxic appearance. He does not appear ill. No distress.   HENT:   Head: Normocephalic and atraumatic.   Ears:   Right Ear: Hearing, tympanic membrane, external ear and ear canal normal. no impacted cerumen  Left Ear: Hearing, tympanic membrane, external ear and ear canal normal. no impacted cerumen  Nose: Nose normal. No mucosal edema, rhinorrhea, nasal deformity or congestion. No  epistaxis. Right sinus exhibits no maxillary sinus tenderness and no frontal sinus tenderness. Left sinus exhibits no maxillary sinus tenderness and no frontal sinus tenderness.   Mouth/Throat: Uvula is midline, oropharynx is clear and moist and mucous membranes are normal. No trismus in the jaw. Normal dentition. No uvula swelling. No oropharyngeal exudate, posterior oropharyngeal edema or posterior oropharyngeal erythema.   Eyes: Conjunctivae and lids are normal. Right eye exhibits no discharge. Left eye exhibits no discharge. No scleral icterus.   Neck: Trachea normal and phonation normal. Neck supple. No edema present. No erythema present. No neck rigidity present.   Cardiovascular: Normal rate, regular rhythm, normal heart sounds and normal pulses.   No murmur heard.Exam reveals no gallop and no friction rub.   Pulmonary/Chest: Effort normal. No stridor. No respiratory distress. He has no decreased breath sounds. He has no wheezes. He has no rhonchi. He has rales in the right upper field, the right middle field and the left upper field.   Abdominal: Normal appearance.   Musculoskeletal:      Cervical back: He exhibits no tenderness.   Lymphadenopathy:     He has no cervical adenopathy.   Neurological: He is alert and oriented to person, place, and time. He exhibits normal muscle tone.   Skin: Skin is warm, dry, intact, not diaphoretic, not pale and no rash.   Psychiatric: His speech is normal and behavior is normal. Mood, judgment and thought content normal.   Nursing note and vitals reviewed.  XR CHEST PA AND LATERAL    Result Date: 5/21/2024  EXAMINATION: CHEST PA AND LATERAL CLINICAL HISTORY: r/o pneumonia; Hemoptysis TECHNIQUE: PA and lateral chest radiograph COMPARISON: <None.> FINDINGS: The cardiac silhouette is within normal limits.   There is no focal consolidation, pneumothorax, or pleural effusion.     No acute intrathoracic abnormality. Electronically signed by: Pham Abdalla Date:    05/21/2024  Time:    17:59       Assessment:     1. Acute bronchitis, unspecified organism        Plan:       Acute bronchitis, unspecified organism  -     XR CHEST PA AND LATERAL  -     benzonatate (TESSALON) 200 MG capsule; Take 1 capsule (200 mg total) by mouth 3 (three) times daily as needed for Cough.  Dispense: 30 capsule; Refill: 0  -     predniSONE (DELTASONE) 10 MG tablet; Take 4 tablets (40 mg total) by mouth once daily for 3 days, THEN 3 tablets (30 mg total) once daily for 2 days, THEN 2 tablets (20 mg total) once daily for 2 days, THEN 1 tablet (10 mg total) once daily for 2 days.  Dispense: 24 tablet; Refill: 0           Results reviewed, pt notified  I have reviewed the patient chart and pertinent past imaging/labs.       Patient Instructions   We will follow up with a chest x-ray results once available.  We will make recommendations once results are available.  Use Tessalon as needed for cough. Tylenol with food as needed for pain relief.  [place] has been notified that you were en route.

## 2024-08-13 ENCOUNTER — TELEPHONE (OUTPATIENT)
Dept: FAMILY MEDICINE | Facility: CLINIC | Age: 29
End: 2024-08-13
Payer: COMMERCIAL

## 2024-08-13 DIAGNOSIS — Z23 IMMUNIZATION DUE: Primary | ICD-10-CM

## 2024-08-13 NOTE — TELEPHONE ENCOUNTER
Pt needs to update shot record for school. Copy of pt's shot record in your inbox for review. Please add orders for any vaccines that are needed.

## 2024-08-13 NOTE — TELEPHONE ENCOUNTER
----- Message from Sujatha Isaac sent at 8/12/2024  4:19 PM CDT -----  Contact: [phone]  [email or web address]     Patient is calling for Medical Advice regarding:needs immunization records     How long has patient had these symptoms:    Pharmacy name and phone#:    Patient wants a call back or [facility]:call back     Comments:  Pt is needing this for school   Please advise patient replies from provider may take up to 48 hours.

## 2024-08-13 NOTE — TELEPHONE ENCOUNTER
Needs hepatitis a, meningitis (A,C,W,Y) and Tdap-for public school   If going to college should receive all but not sure where he is and what is required    Last office visit 2021